# Patient Record
Sex: MALE | Race: WHITE | URBAN - METROPOLITAN AREA
[De-identification: names, ages, dates, MRNs, and addresses within clinical notes are randomized per-mention and may not be internally consistent; named-entity substitution may affect disease eponyms.]

---

## 2024-03-21 ENCOUNTER — HOSPITAL ENCOUNTER (OUTPATIENT)
Age: 34
Discharge: HOME | End: 2024-03-21
Payer: COMMERCIAL

## 2024-03-21 ENCOUNTER — HOSPITAL ENCOUNTER (OUTPATIENT)
Dept: GENERAL RADIOLOGY | Age: 34
Discharge: HOME | End: 2024-03-21
Payer: COMMERCIAL

## 2024-03-21 ENCOUNTER — OFFICE VISIT (OUTPATIENT)
Age: 34
End: 2024-03-21

## 2024-03-21 VITALS
BODY MASS INDEX: 32.47 KG/M2 | TEMPERATURE: 98 F | DIASTOLIC BLOOD PRESSURE: 93 MMHG | HEIGHT: 73 IN | WEIGHT: 245 LBS | SYSTOLIC BLOOD PRESSURE: 131 MMHG | OXYGEN SATURATION: 98 % | HEART RATE: 101 BPM

## 2024-03-21 DIAGNOSIS — J40 BRONCHITIS: ICD-10-CM

## 2024-03-21 DIAGNOSIS — J40 BRONCHITIS: Primary | ICD-10-CM

## 2024-03-21 PROCEDURE — 71046 X-RAY EXAM CHEST 2 VIEWS: CPT

## 2024-03-21 RX ORDER — AZITHROMYCIN 250 MG/1
TABLET, FILM COATED ORAL
Qty: 6 TABLET | Refills: 0 | Status: SHIPPED | OUTPATIENT
Start: 2024-03-21 | End: 2024-03-31

## 2024-03-21 RX ORDER — HYDROXYZINE HYDROCHLORIDE 25 MG/1
25 TABLET, FILM COATED ORAL 3 TIMES DAILY PRN
COMMUNITY
Start: 2024-02-02

## 2024-03-21 RX ORDER — FLUOXETINE 10 MG/1
10 CAPSULE ORAL DAILY
COMMUNITY

## 2024-03-21 ASSESSMENT — ENCOUNTER SYMPTOMS: COUGH: 1

## 2024-03-21 NOTE — PROGRESS NOTES
Trev Nj (:  1990) is a 33 y.o. male,New patient, here for evaluation of the following chief complaint(s):  Cough (Tightness in chesst, hurts to cough, pain on left side of chest when coughing. )      ASSESSMENT/PLAN:  1. Bronchitis  - azithromycin (ZITHROMAX) 250 MG tablet; 500mg on day 1 followed by 250mg on days 2 - 5  Dispense: 6 tablet; Refill: 0  - XR CHEST STANDARD (2 VW); Future   -KEEP WELL HYDRATION    Return if symptoms worsen or fail to improve.    SUBJECTIVE/OBJECTIVE:  PRESENT TO CLINIC WITH COUGH.CHEST TIGHTNESS AND LEFT SIDE OF CHEST HURT WITH COUGHING .WORRY ABOUT PNEUMONIA      History provided by:  Patient  Cough        Vitals:    24 1258   BP: (!) 131/93   Pulse: (!) 101   Temp: 98 °F (36.7 °C)   TempSrc: Oral   SpO2: 98%   Weight: 111.1 kg (245 lb)   Height: 1.854 m (6' 1\")       Review of Systems   Respiratory:  Positive for cough.        Physical Exam  Constitutional:       Appearance: Normal appearance.   HENT:      Head: Normocephalic and atraumatic.      Nose: Nose normal.      Mouth/Throat:      Mouth: Mucous membranes are moist.   Eyes:      Pupils: Pupils are equal, round, and reactive to light.   Pulmonary:      Effort: Pulmonary effort is normal.      Breath sounds: Normal breath sounds.   Musculoskeletal:         General: Normal range of motion.      Cervical back: Normal range of motion and neck supple.   Neurological:      Mental Status: He is alert and oriented to person, place, and time.   Psychiatric:         Mood and Affect: Mood normal.           An electronic signature was used to authenticate this note.    --Jese Cortés DO